# Patient Record
Sex: MALE | Employment: PART TIME | URBAN - METROPOLITAN AREA
[De-identification: names, ages, dates, MRNs, and addresses within clinical notes are randomized per-mention and may not be internally consistent; named-entity substitution may affect disease eponyms.]

---

## 2019-09-23 ENCOUNTER — HOSPITAL ENCOUNTER (OUTPATIENT)
Dept: GENERAL RADIOLOGY | Age: 54
Discharge: HOME | End: 2019-09-23
Payer: COMMERCIAL

## 2019-09-23 DIAGNOSIS — M25.511 RIGHT SHOULDER PAIN: ICD-10-CM

## 2019-09-23 PROCEDURE — 73030 X-RAY EXAM OF SHOULDER: CPT

## 2020-02-19 ENCOUNTER — OFFICE VISIT (OUTPATIENT)
Dept: ORTHOPEDIC SURGERY | Age: 55
End: 2020-02-19

## 2020-02-19 VITALS
DIASTOLIC BLOOD PRESSURE: 80 MMHG | RESPIRATION RATE: 16 BRPM | SYSTOLIC BLOOD PRESSURE: 148 MMHG | HEART RATE: 68 BPM | BODY MASS INDEX: 46.65 KG/M2 | WEIGHT: 315 LBS | HEIGHT: 69 IN | OXYGEN SATURATION: 93 % | TEMPERATURE: 99 F

## 2020-02-19 DIAGNOSIS — M47.816 FACET ARTHROPATHY, LUMBAR: Primary | ICD-10-CM

## 2020-02-19 RX ORDER — NAPROXEN 500 MG/1
500 TABLET ORAL 2 TIMES DAILY WITH MEALS
COMMUNITY

## 2020-02-19 RX ORDER — METHYLPREDNISOLONE 4 MG/1
TABLET ORAL
Qty: 1 DOSE PACK | Refills: 0 | Status: SHIPPED | OUTPATIENT
Start: 2020-02-19 | End: 2020-06-11

## 2020-02-19 RX ORDER — HYDROCHLOROTHIAZIDE 12.5 MG/1
12.5 CAPSULE ORAL DAILY
COMMUNITY

## 2020-02-19 RX ORDER — AMLODIPINE BESYLATE 5 MG/1
5 TABLET ORAL DAILY
COMMUNITY
End: 2020-06-11

## 2020-02-19 NOTE — PATIENT INSTRUCTIONS
Back Stretches: Exercises  Introduction  Here are some examples of exercises for stretching your back. Start each exercise slowly. Ease off the exercise if you start to have pain. Your doctor or physical therapist will tell you when you can start these exercises and which ones will work best for you. How to do the exercises  Overhead stretch    1. Stand comfortably with your feet shoulder-width apart. 2. Looking straight ahead, raise both arms over your head and reach toward the ceiling. Do not allow your head to tilt back. 3. Hold for 15 to 30 seconds, then lower your arms to your sides. 4. Repeat 2 to 4 times. Side stretch    1. Stand comfortably with your feet shoulder-width apart. 2. Raise one arm over your head, and then lean to the other side. 3. Slide your hand down your leg as you let the weight of your arm gently stretch your side muscles. Hold for 15 to 30 seconds. 4. Repeat 2 to 4 times on each side. Press-up    1. Lie on your stomach, supporting your body with your forearms. 2. Press your elbows down into the floor to raise your upper back. As you do this, relax your stomach muscles and allow your back to arch without using your back muscles. As your press up, do not let your hips or pelvis come off the floor. 3. Hold for 15 to 30 seconds, then relax. 4. Repeat 2 to 4 times. Relax and rest    1. Lie on your back with a rolled towel under your neck and a pillow under your knees. Extend your arms comfortably to your sides. 2. Relax and breathe normally. 3. Remain in this position for about 10 minutes. 4. If you can, do this 2 or 3 times each day. Follow-up care is a key part of your treatment and safety. Be sure to make and go to all appointments, and call your doctor if you are having problems. It's also a good idea to know your test results and keep a list of the medicines you take. Where can you learn more? Go to http://felisha-sherry.info/.   Enter X733 in the search box to learn more about \"Back Stretches: Exercises. \"  Current as of: June 26, 2019  Content Version: 12.2  © 4691-0815 [employer]. Care instructions adapted under license by [employer] (which disclaims liability or warranty for this information). If you have questions about a medical condition or this instruction, always ask your healthcare professional. [address] any warranty or liability for your use of this information.
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
## 2020-03-02 ENCOUNTER — APPOINTMENT (OUTPATIENT)
Dept: PHYSICAL THERAPY | Age: 55
End: 2020-03-02

## 2020-06-11 ENCOUNTER — OFFICE VISIT (OUTPATIENT)
Dept: ORTHOPEDIC SURGERY | Facility: CLINIC | Age: 55
End: 2020-06-11

## 2020-06-11 VITALS
HEIGHT: 69 IN | RESPIRATION RATE: 20 BRPM | OXYGEN SATURATION: 98 % | WEIGHT: 315 LBS | TEMPERATURE: 97.6 F | DIASTOLIC BLOOD PRESSURE: 84 MMHG | BODY MASS INDEX: 46.65 KG/M2 | SYSTOLIC BLOOD PRESSURE: 145 MMHG | HEART RATE: 86 BPM

## 2020-06-11 DIAGNOSIS — E66.01 OBESITY, MORBID (HCC): ICD-10-CM

## 2020-06-11 DIAGNOSIS — S93.492A SPRAIN OF ANTERIOR TALOFIBULAR LIGAMENT OF LEFT ANKLE, INITIAL ENCOUNTER: Primary | ICD-10-CM

## 2020-06-11 DIAGNOSIS — M25.572 ACUTE LEFT ANKLE PAIN: ICD-10-CM

## 2020-06-11 NOTE — PROGRESS NOTES
1. Have you been to the ER, urgent care clinic since your last visit? Hospitalized since your last visit? No    2. Have you seen or consulted any other health care providers outside of the [address] [place] [address] since your last visit? Include any pap smears or colon screening.  No

## 2020-06-11 NOTE — PROGRESS NOTES
AMBULATORY PROGRESS NOTE      Patient: Sandra Olivo             MRN: [id number]     SSN: [id number] Body mass index is 48.73 kg/m². YOB: 1965     AGE: 48 y.o. EX: male    PCP: Salvador Guzman MD       IMPRESSION //  DIAGNOSIS AND TREATMENT PLAN      DIAGNOSES  1. Sprain of anterior talofibular ligament of left ankle, initial encounter    2. Acute left ankle pain    3. Obesity, morbid ([address]        Orders Placed This Encounter    Generic Supply Order     Ankle sprain  airsport brace   left    [27644] Ankle Min 3V     Order Specific Question:   Weight bearing? Answer:   No        He has a left medial deltoid ankle sprain after injury that occurred about 2 to 3 weeks ago. He came home went to bed fell asleep and fell out of his bed accidentally. He describes falling onto his foot with his knee bent straight in his foot and plantar flexion. Ever since then he is been having some medial hindfoot pain the pain is improved over the last few days or so, but today had quite a bit of discomfort, as he tried to go out to walk for exercising only lasted about 5 minutes before he had posterior medial left ankle pain. We will have him go to [facility], over [facility] for a left-sided use for Aircast brace. I reassess him in 3 weeks. Our [facility] air sport [facility]ast [facility] HPI //  [address] [place] IS A 48 y.o. male who presents to my outpatient office for evaluation of:  Wade Castillo    History is as above. Chief complaint, is medial ankle pain after fall out of bed 2 to 3 weeks ago. Despite Ace wrap over-the-counter ankle support, he still having discomfort the medial portion of his left ankle. He had trouble walking today as he attempted to go walk, go on a walk for about 5 minutes only lasted that short duration had to come back.     He denies an any clicking  popping or grinding to the ankle. ANKLE/FOOT left    Visit Vitals  /84 (BP 1 Location: Left arm, BP Patient Position: Sitting)   Pulse 86   Temp 97.6 °F (36.4 °C) (Oral)   Resp 20   Ht 5' 9\" (1.753 m)   Wt 330 lb (149.7 kg)   SpO2 98%   BMI 48.73 kg/m²        Psychiatry: Alert, Oriented x 3; Speech normal in context and clarity,            Memory intact grossly, no involuntary movements - tremors, no dementia  Gait: normal  Tenderness:none  tenderness he has tender the posterior tibial tendon, posteriorly, this left ankle, he is active non tender to the fibula, syndesmosis proximal fibula. External rotation test does not cause any discomfort, non tender midfoot forefoot either at the calcaneal cuboid or talonavicular regions subtalar   cutaneous: Mild to moderate swelling, is present in the portion of the left ankle   Joint Motion:   ST Normal, TT Normal, Forefoot MTP's Normal  Joint / Tendon Stability: No Ankle or Subtalar instability or joint laxity. No peroneal sublux ability or dislocation  Alignment: Forefoot, Midfoot, Hindfoot WNL. Integumentary: Swelling to the medial aspect ankle, mild to moderate no ecchymosis  Contractures: None present  ROM: WNL  Neuro Motor/Sensory: NL/NL, Vascular: NL foot/ankle pulses  Lymphatics: No extremity lymphedema, No calf swelling, no tenderness to calf muscles. CHART REVIEW     Patient Active Problem List   Diagnosis Code    Obesity, morbid ([facility] [address] E66.01        [facility] has been experiencing pain and discomfort confirmed as outlined in the pain assessment outlined below.       Pain Assessment  6/11/2020   Location of Pain Ankle   Location Modifiers Left   Severity of Pain 4   Quality of Pain Aching   Duration of Pain Persistent   Frequency of Pain Constant   Aggravating Factors Walking;Standing   Aggravating Factors Comment -   Limiting Behavior -   Relieving Factors Other (Comment)   Relieving Factors Comment Ace bandage   Result of Injury - Work-Related Injury -   Type of Injury -        Shar Ortega  has a past medical history of Hypertension. Patients is employed at:         Past Medical History:   Diagnosis Date    Hypertension      Current Outpatient Medications   Medication Sig    hydroCHLOROthiazide (MICROZIDE) 12.5 mg capsule Take 12.5 mg by mouth daily.  naproxen (NAPROSYN) 500 mg tablet Take 500 mg by mouth two (2) times daily (with meals).  lisinopril (PRINIVIL, ZESTRIL) 10 mg tablet Take 10 mg by mouth daily.  amLODIPine (NORVASC) 5 mg tablet Take 5 mg by mouth daily. No current facility-administered medications for this visit. THE  FOR Trang Kim BY Ramiro Amaral MD 2020 . No Known Allergies  History reviewed. No pertinent surgical history. Social History     Occupational History    Not on file   Tobacco Use    Smoking status: Former Smoker     Types: Cigarettes     Last attempt to quit: 10/19/2019     Years since quittin.6    Smokeless tobacco: Never Used   Substance and Sexual Activity    Alcohol use: No    Drug use: No    Sexual activity: Not on file     History reviewed. No pertinent family history. DIAGNOSTIC IMAGING  LAB DATA      No results found for: HBA1C, HGBE8, DOL4THEB, DYJ9ZALN // No results found for: GLU, GLUCPOC     No results found for: YEO6ABNW, NNS5UBKG      No results found for: VITD3, XQVID2, XQVID3, XQVID, VD3RIA, [id number]      REVIEW OF SYSTEMS : 2020  ALL BELOW ARE Negative except : SEE HPI      REVIEW OF SYSTEMS : Total of 12 systems reviewed as follows:          CONSTITUTIONAL: No weight loss. PSYCHOLOGICAL : No Feelings of anxiety, depression, agitation  EYES: No blurred vision and no eye discharge. NO eye pain, double vision  ENT: No nasal discharge. No ear pain. CARDIOVASCULAR: No chest pain and no diaphoresis. RESPIRATORY: No cough, no hemoptysis.    GI: No vomiting, no diarrhea   : No urinary frequency and no dysuria. MUSCULOSKELETAL: see HPI  SKIN: No rashes. NEURO:  No dizziness,weakness, headaches// No visual changes or confusion, or seizures,   ENDOCRINE: No polyphagia and no polydipsia. HEMATOLOGY: No bleeding tendencies. DIAGNOSTIC IMAGING        X RAYS/IMAGES DONE AT [address] [place] [address] 6/11/2020      X-rays 3 views of the left ankle. There is some evidence of old stress changes to the fibula, and in the distal tibia, and at the syndesmotic region, representing remote injury. The ankle joint, is concentric aligned, and is nonweightbearing x-rays. There is some soft swelling, seen medially, laterally, to the ankle, more medial than lateral.  I see no air in the soft tissues. There is some degenerative changes at distal tip anterior to the tibia some degenerative changes to the great reaffirming TMT regions laterally to have x-rays also marked a large bunion at the insertion point of this Achilles tendon    Please see above section of this report. I have personally reviewed the results of the above study. The interpretation of this study is my professional opinion.       Devin Ren MD  6/11/2020  2:15 PM

## 2020-09-28 ENCOUNTER — OFFICE VISIT (OUTPATIENT)
Dept: ORTHOPEDIC SURGERY | Age: 55
End: 2020-09-28
Payer: COMMERCIAL

## 2020-09-28 VITALS
SYSTOLIC BLOOD PRESSURE: 145 MMHG | WEIGHT: 315 LBS | TEMPERATURE: 97.8 F | HEIGHT: 69 IN | BODY MASS INDEX: 46.65 KG/M2 | HEART RATE: 68 BPM | OXYGEN SATURATION: 93 % | DIASTOLIC BLOOD PRESSURE: 73 MMHG

## 2020-09-28 DIAGNOSIS — M76.822 POSTERIOR TIBIAL TENDINITIS OF LEFT LOWER EXTREMITY: ICD-10-CM

## 2020-09-28 DIAGNOSIS — S93.492D SPRAIN OF ANTERIOR TALOFIBULAR LIGAMENT OF LEFT ANKLE, SUBSEQUENT ENCOUNTER: Primary | ICD-10-CM

## 2020-09-28 DIAGNOSIS — E66.01 OBESITY, MORBID (HCC): ICD-10-CM

## 2020-09-28 PROCEDURE — 99214 OFFICE O/P EST MOD 30 MIN: CPT | Performed by: ORTHOPAEDIC SURGERY

## 2020-09-28 RX ORDER — IBUPROFEN 800 MG/1
TABLET ORAL
COMMUNITY

## 2020-09-28 NOTE — PROGRESS NOTES
AMBULATORY PROGRESS NOTE      Patient: Nory Mejia             MRN: [id number]     SSN: [id number] Body mass index is 49 kg/m². YOB: 1965     AGE: 48 y.o. EX: male    PCP: Divya Hoffman MD       IMPRESSION //  DIAGNOSIS AND TREATMENT PLAN      DIAGNOSES  1. Sprain of anterior talofibular ligament of left ankle, subsequent encounter    2. Obesity, morbid ([address]    3. Posterior tibial tendinitis of left lower extremity        Orders Placed This Encounter    MRI ANKLE RT WO CONT     Standing Status:   Future     Standing Expiration Date:   3/28/2021     Order Specific Question:   Arthrogram study     Answer:   No     Order Specific Question:   Reason for Exam     Answer: To assess spring, deltoid, and posterotibial ligaments.  ibuprofen (MOTRIN) 800 mg tablet     Sig: Take  by mouth. This individual, and exquisite tenderness, still to the medial portion of his right ankle, recommendations MRI of the right ankle to assess the deltoid ligament posterior tibial tendon specifically. He tells that his PCP, is treating him for gout. He is been put on allopurinol, but the prescription has run out. He was on a Medrol Dosepak, he states, which helped a fair amount. This was on his prescription written about 2 weeks ago. I see no documentation in the [facility] care to confirm this however. Plan:    1. MRI of the right ankle To assess spring, deltoid, and posterotibial ligaments. 2. I will need to obtain blood work results to determine if it is consistent with gout. 3.  I will need to talk to her obtain results from Divya Hoffman MD to confirm whether he does have hyperuricemia. RTO-after MRI      HPI //  OBJECTIVE EXAMINATION     Nory Mejia IS A 48 y.o. male who presents to my outpatient office for evaluation of: right ankle pain. The patient was last seen on 06/02/2020 for Sprain of anterior talofibular ligament of left ankle.      Since the last OV, Karma Dawson continues to endorse persistent pain in is right ankle to anterior medial gutter and along the posterior tibial tendon. The patient also reports some warmth and redness in his ankle. He describes symptoms indicative of gout. He reports that he recently received bloodwork 2 weeks ago, as prescribed by his PCP. He is currently taking Allopurinol, 2 doses. ANKLE/FOOT left    Visit Vitals  BP (!) 145/73 (BP 1 Location: Right arm, BP Patient Position: Sitting)   Pulse 68   Temp 97.8 °F (36.6 °C) (Temporal)   Ht 5' 9\" (1.753 m)   Wt 331 lb 12.8 oz (150.5 kg)   SpO2 93%   BMI 49.00 kg/m²        Psychiatry: Alert, Oriented x 3; Speech normal in context and clarity,            Memory intact grossly, no involuntary movements - tremors, no dementia  Gait: normal  Tenderness:    Distinctly tender to the posterior tibial tendon, and when I perform a valgus stress test, does cause him distinct tenderness the medial portion of his ankle. There is no gross instability anterior drawer, when assessing the lateral ligaments. When I stressed the medial ligaments, valgus stress, does cause him discomfort, along the anterior gutter of the ankle, and along the left posterior tibial tendon. cutaneous: Mild to moderate swelling, is present in the portion of the left ankle  Joint Motion:   ST Normal, TT Normal, Forefoot MTP's Normal  Joint / Tendon Stability: No Ankle or Subtalar instability or joint laxity. No peroneal sublux ability or dislocation  Alignment: Forefoot, Midfoot, Hindfoot WNL. Integumentary: Swelling to the medial aspect ankle, mild to moderate no ecchymosis  Contractures: None present  ROM: WNL  Neuro Motor/Sensory: NL/NL, Vascular: NL foot/ankle pulses  Lymphatics: No extremity lymphedema, No calf swelling, no tenderness to calf muscles.         CHART REVIEW     Patient Active Problem List   Diagnosis Code    Obesity, morbid ([facility] [address] E66.01 Benjamín Parker has been experiencing pain and discomfort confirmed as outlined in the pain assessment outlined below. Pain Assessment  2020   Location of Pain Ankle   Location Modifiers Right   Severity of Pain 5   Quality of Pain Sharp; Throbbing   Duration of Pain Persistent   Frequency of Pain Intermittent   Date Pain First Started 2020   Aggravating Factors Walking   Aggravating Factors Comment -   Limiting Behavior Yes   Relieving Factors (No Data)   Relieving Factors Comment pain meds   Result of Injury No   Work-Related Injury -   Type of Injury -        Benjamín Parker  has a past medical history of Hypertension. Patients is employed at:         Past Medical History:   Diagnosis Date    Hypertension      Current Outpatient Medications   Medication Sig    ibuprofen (MOTRIN) 800 mg tablet Take  by mouth.  hydroCHLOROthiazide (MICROZIDE) 12.5 mg capsule Take 12.5 mg by mouth daily.  amLODIPine (NORVASC) 5 mg tablet Take 5 mg by mouth daily.  naproxen (NAPROSYN) 500 mg tablet Take 500 mg by mouth two (2) times daily (with meals).  lisinopril (PRINIVIL, ZESTRIL) 10 mg tablet Take 10 mg by mouth daily. No current facility-administered medications for this visit. THE  FOR Benjamín Parker  WAS REVIEWED BY Tavares Pelletier MD 2020 . No Known Allergies  History reviewed. No pertinent surgical history. Social History     Occupational History    Not on file   Tobacco Use    Smoking status: Former Smoker     Types: Cigarettes     Last attempt to quit: 10/19/2019     Years since quittin.9    Smokeless tobacco: Never Used   Substance and Sexual Activity    Alcohol use: No    Drug use: No    Sexual activity: Not on file     History reviewed. No pertinent family history.      DIAGNOSTIC IMAGING  LAB DATA      No results found for: HBA1C, HGBE8, PWX7VUID, GBV2JKQX // No results found for: GLU, GLUCPOC     No results found for: AEV3OXXA, RKD1WNLK      No results found for: VITD3, [address], XQVID, VD3RIA, [id number]      REVIEW OF SYSTEMS : 9/28/2020  ALL BELOW ARE Negative except : SEE HPI      REVIEW OF SYSTEMS : Total of 12 systems reviewed as follows:          CONSTITUTIONAL: No weight loss. PSYCHOLOGICAL : No Feelings of anxiety, depression, agitation  EYES: No blurred vision and no eye discharge. NO eye pain, double vision  ENT: No nasal discharge. No ear pain. CARDIOVASCULAR: No chest pain and no diaphoresis. RESPIRATORY: No cough, no hemoptysis. GI: No vomiting, no diarrhea   : No urinary frequency and no dysuria. MUSCULOSKELETAL: see HPI  SKIN: No rashes. NEURO:  No dizziness,weakness, headaches// No visual changes or confusion, or seizures,   ENDOCRINE: No polyphagia and no polydipsia. HEMATOLOGY: No bleeding tendencies. DIAGNOSTIC IMAGING      No notes on file    Please see above section of this report. I have personally reviewed the results of the above study. The interpretation of this study is my professional opinion. Written by Michael Gaona, as dictated by Dr. Lucille Rainey. I, Dr. Lucille Rainey, confirm that all documentation is accurate.

## 2020-09-29 DIAGNOSIS — M76.822 POSTERIOR TIBIAL TENDINITIS OF LEFT LOWER EXTREMITY: ICD-10-CM
